# Patient Record
Sex: MALE | Race: OTHER | NOT HISPANIC OR LATINO | URBAN - METROPOLITAN AREA
[De-identification: names, ages, dates, MRNs, and addresses within clinical notes are randomized per-mention and may not be internally consistent; named-entity substitution may affect disease eponyms.]

---

## 2024-06-16 ENCOUNTER — EMERGENCY (EMERGENCY)
Facility: HOSPITAL | Age: 64
LOS: 1 days | Discharge: HOME | End: 2024-06-16
Attending: INTERNAL MEDICINE | Admitting: INTERNAL MEDICINE
Payer: COMMERCIAL

## 2024-06-16 VITALS
SYSTOLIC BLOOD PRESSURE: 123 MMHG | OXYGEN SATURATION: 96 % | DIASTOLIC BLOOD PRESSURE: 73 MMHG | RESPIRATION RATE: 17 BRPM | HEART RATE: 73 BPM

## 2024-06-16 VITALS
WEIGHT: 190.04 LBS | OXYGEN SATURATION: 99 % | HEART RATE: 79 BPM | SYSTOLIC BLOOD PRESSURE: 122 MMHG | HEIGHT: 68 IN | TEMPERATURE: 98 F | RESPIRATION RATE: 18 BRPM | DIASTOLIC BLOOD PRESSURE: 82 MMHG

## 2024-06-16 PROCEDURE — 73700 CT LOWER EXTREMITY W/O DYE: CPT | Mod: MC

## 2024-06-16 PROCEDURE — 73562 X-RAY EXAM OF KNEE 3: CPT

## 2024-06-16 PROCEDURE — 73000 X-RAY EXAM OF COLLAR BONE: CPT

## 2024-06-16 PROCEDURE — 73060 X-RAY EXAM OF HUMERUS: CPT | Mod: 26,LT

## 2024-06-16 PROCEDURE — 71045 X-RAY EXAM CHEST 1 VIEW: CPT | Mod: 26

## 2024-06-16 PROCEDURE — 73080 X-RAY EXAM OF ELBOW: CPT

## 2024-06-16 PROCEDURE — 73700 CT LOWER EXTREMITY W/O DYE: CPT | Mod: 26,LT,MC

## 2024-06-16 PROCEDURE — 96372 THER/PROPH/DIAG INJ SC/IM: CPT

## 2024-06-16 PROCEDURE — 99285 EMERGENCY DEPT VISIT HI MDM: CPT

## 2024-06-16 PROCEDURE — 71045 X-RAY EXAM CHEST 1 VIEW: CPT

## 2024-06-16 PROCEDURE — 73030 X-RAY EXAM OF SHOULDER: CPT | Mod: 26,LT

## 2024-06-16 PROCEDURE — 73060 X-RAY EXAM OF HUMERUS: CPT

## 2024-06-16 PROCEDURE — 73562 X-RAY EXAM OF KNEE 3: CPT | Mod: 26,LT

## 2024-06-16 PROCEDURE — 73000 X-RAY EXAM OF COLLAR BONE: CPT | Mod: 26,LT

## 2024-06-16 PROCEDURE — 73030 X-RAY EXAM OF SHOULDER: CPT

## 2024-06-16 PROCEDURE — 73080 X-RAY EXAM OF ELBOW: CPT | Mod: 26,LT

## 2024-06-16 PROCEDURE — 99284 EMERGENCY DEPT VISIT MOD MDM: CPT | Mod: 25

## 2024-06-16 RX ORDER — MORPHINE SULFATE 50 MG/1
4 CAPSULE, EXTENDED RELEASE ORAL ONCE
Refills: 0 | Status: DISCONTINUED | OUTPATIENT
Start: 2024-06-16 | End: 2024-06-16

## 2024-06-16 RX ORDER — IBUPROFEN 200 MG
1 TABLET ORAL
Qty: 40 | Refills: 0
Start: 2024-06-16 | End: 2024-06-25

## 2024-06-16 RX ORDER — IBUPROFEN 200 MG
600 TABLET ORAL ONCE
Refills: 0 | Status: COMPLETED | OUTPATIENT
Start: 2024-06-16 | End: 2024-06-16

## 2024-06-16 RX ORDER — OXYCODONE AND ACETAMINOPHEN 5; 325 MG/1; MG/1
1 TABLET ORAL
Qty: 12 | Refills: 0
Start: 2024-06-16 | End: 2024-06-18

## 2024-06-16 RX ADMIN — MORPHINE SULFATE 4 MILLIGRAM(S): 50 CAPSULE, EXTENDED RELEASE ORAL at 16:35

## 2024-06-16 RX ADMIN — Medication 600 MILLIGRAM(S): at 18:52

## 2024-06-16 NOTE — ED ADULT NURSE NOTE - NSFALLRISKINTERV_ED_ALL_ED

## 2024-06-16 NOTE — ED PROVIDER NOTE - PATIENT PORTAL LINK FT
You can access the FollowMyHealth Patient Portal offered by [facility] by registering at the following website: [email or web address]. By joining [facility]’s FollowMyHealth portal, you will also be able to view your health information using other applications (apps) compatible with our system.

## 2024-06-16 NOTE — ED PROVIDER NOTE - CARE PLAN
Principal Discharge DX:	Fracture of proximal end of left humerus  Secondary Diagnosis:	Accidental fall   1 Principal Discharge DX:	Fracture of proximal end of left humerus  Secondary Diagnosis:	Accidental fall  Secondary Diagnosis:	Tibial plateau fracture, left

## 2024-06-16 NOTE — ED PROVIDER NOTE - CLINICAL SUMMARY MEDICAL DECISION MAKING FREE TEXT BOX
63-year-old male with no significant past medical history fell from a height on the ground in the playground on the soft ground landed on the left shoulder and the left elbow complaining of left shoulder pain also hurt his left knee patient unable to ambulate Patient speaks Cantonese Chinese  Stephanie ID number [id number]  X-ray of the left shoulder shows proximal humerus fracture on the left side left knee suspicion for tibial plateau fracture CT also ordered results pending Patient was recommended to be hospitalized since the patient is unable to use crutches and has injury in the left knee patient declined they have a wheelchair at home and family members will help him out 63-year-old male with no significant past medical history fell from a height on the ground in the playground on the soft ground landed on the left shoulder and the left elbow complaining of left shoulder pain also hurt his left knee patient unable to ambulate Patient speaks Cantonese Chinese  Stephanie ID number [id number]  X-ray of the left shoulder shows proximal humerus fracture on the left side left knee suspicion for tibial plateau fracture CT also ordered results pending Patient was recommended to be hospitalized since the patient is unable to use crutches and has injury in the left knee patient declined they have a wheelchair at home and family members will help him out  The left knee shows left tibial plateau fracture  Case discussed with Dr. Lundberg orthopedics on-call recommended outpatient follow-up patient is able to ambulate with the crutches

## 2024-06-16 NOTE — ED PROVIDER NOTE - OBJECTIVE STATEMENT
63-year-old male with no significant past medical history fell from a height on the ground in the playground on the soft ground landed on the left shoulder and the left elbow complaining of left shoulder pain also hurt his left knee patient unable to ambulate Patient speaks Cantonese Chinese  Stephanie ID number [id number]

## 2024-06-16 NOTE — ED PROVIDER NOTE - NSFOLLOWUPINSTRUCTIONS_ED_ALL_ED_FT
Follow up with your PMD within 1-2 days.  Rest, increase your fluids, advance your activity as tolerated.   Take all of your other medications as previously prescribed.   Worsening, continued or ANY new concerning symptoms return to the emergency department.  Percocet 1 tablet every 6 hours as needed for pain Motrin 600 mg every 6 hours as needed for mild to moderate pain follow-up with orthopedic doctor outpatient

## 2024-06-16 NOTE — ED ADULT NURSE NOTE - OBJECTIVE STATEMENT
Patient brought to ED after sustaining a fall on the dock. Denies hitting his head or loss of consciousness. Alert and oriented x 4. pt reports falling on L-side injuring his L-shoulder and L-knee. Patient states "he took an aspirin today" PTA. denies anticoagulant use. denies CP, sob, n/v/d. +L-radial pulse. no obvious deformities.

## 2024-06-16 NOTE — ED PROVIDER NOTE - CARE PROVIDER_API CALL
Fransisco Lundberg  Orthopaedic Surgery  [address] [place] [address], [facility] [address]  [place] [address]  Phone: [phone]  Fax: [phone]  Follow Up Time:

## 2024-06-16 NOTE — ED ADULT TRIAGE NOTE - CHIEF COMPLAINT QUOTE
Patient brought to ED after susating a fall on the dock. Denies hitting his head or loss of consciousness. Alert and oriented x 4. Left shoulder pain and bilater hand pain. Patient states "he took an aspirin today. "